# Patient Record
Sex: FEMALE | Race: WHITE | NOT HISPANIC OR LATINO | URBAN - NONMETROPOLITAN AREA
[De-identification: names, ages, dates, MRNs, and addresses within clinical notes are randomized per-mention and may not be internally consistent; named-entity substitution may affect disease eponyms.]

---

## 2020-06-30 ENCOUNTER — OFFICE VISIT (OUTPATIENT)
Dept: URBAN - NONMETROPOLITAN AREA SURGERY CENTER 1 | Facility: SURGERY CENTER | Age: 25
End: 2020-06-30
Payer: COMMERCIAL

## 2020-06-30 DIAGNOSIS — K22.8 COLUMNAR-LINED ESOPHAGUS: ICD-10-CM

## 2020-06-30 DIAGNOSIS — R19.4 ALTERED BOWEL HABITS: ICD-10-CM

## 2020-06-30 DIAGNOSIS — R10.84 ABDOMINAL CRAMPING, GENERALIZED: ICD-10-CM

## 2020-06-30 DIAGNOSIS — K29.60 ADENOPAPILLOMATOSIS GASTRICA: ICD-10-CM

## 2020-06-30 PROCEDURE — G9937 DIG OR SURV COLSCO: HCPCS | Performed by: INTERNAL MEDICINE

## 2020-06-30 PROCEDURE — 45380 COLONOSCOPY AND BIOPSY: CPT | Performed by: INTERNAL MEDICINE

## 2020-06-30 PROCEDURE — 43239 EGD BIOPSY SINGLE/MULTIPLE: CPT | Performed by: INTERNAL MEDICINE

## 2020-06-30 PROCEDURE — G8907 PT DOC NO EVENTS ON DISCHARG: HCPCS | Performed by: INTERNAL MEDICINE

## 2020-07-01 ENCOUNTER — TELEPHONE ENCOUNTER (OUTPATIENT)
Dept: URBAN - NONMETROPOLITAN AREA CLINIC 2 | Facility: CLINIC | Age: 25
End: 2020-07-01

## 2020-07-21 ENCOUNTER — WEB ENCOUNTER (OUTPATIENT)
Dept: URBAN - NONMETROPOLITAN AREA CLINIC 2 | Facility: CLINIC | Age: 25
End: 2020-07-21

## 2020-07-21 ENCOUNTER — OFFICE VISIT (OUTPATIENT)
Dept: URBAN - NONMETROPOLITAN AREA CLINIC 2 | Facility: CLINIC | Age: 25
End: 2020-07-21
Payer: COMMERCIAL

## 2020-07-21 DIAGNOSIS — K58.0 IRRITABLE BOWEL SYNDROME WITH DIARRHEA: ICD-10-CM

## 2020-07-21 DIAGNOSIS — K30 FUNCTIONAL DYSPEPSIA: ICD-10-CM

## 2020-07-21 PROBLEM — 197125005: Status: ACTIVE | Noted: 2020-07-21

## 2020-07-21 PROBLEM — 3696007: Status: ACTIVE | Noted: 2020-07-21

## 2020-07-21 PROCEDURE — G8420 CALC BMI NORM PARAMETERS: HCPCS | Performed by: NURSE PRACTITIONER

## 2020-07-21 PROCEDURE — 99213 OFFICE O/P EST LOW 20 MIN: CPT | Performed by: NURSE PRACTITIONER

## 2020-07-21 PROCEDURE — 1036F TOBACCO NON-USER: CPT | Performed by: NURSE PRACTITIONER

## 2020-07-21 PROCEDURE — G8427 DOCREV CUR MEDS BY ELIG CLIN: HCPCS | Performed by: NURSE PRACTITIONER

## 2020-07-21 RX ORDER — FAMOTIDINE 20 MG/1
1 TABLET TWICE DAILY TABLET, FILM COATED ORAL BID
Qty: 180 TABLET | Refills: 1 | OUTPATIENT
Start: 2020-07-21

## 2020-07-21 RX ORDER — RIFAXIMIN 550 MG/1
1 TABLET TABLET ORAL THREE TIMES A DAY
Qty: 42 TABLET | Refills: 2 | OUTPATIENT
Start: 2020-07-21 | End: 2020-09-01

## 2020-07-21 RX ORDER — PANTOPRAZOLE SODIUM 40 MG/1
TAKE 1 TABLET BY ORAL ROUTE DAILY FOR 90 DAYS TABLET, DELAYED RELEASE ORAL 1
OUTPATIENT
Start: 2020-02-28 | End: 2021-02-22

## 2020-07-21 RX ORDER — LISDEXAMFETAMINE DIMESYLATE 30 MG/1
CAPSULE ORAL
Qty: 0 | Refills: 0 | Status: ACTIVE | COMMUNITY
Start: 1900-01-01

## 2020-07-21 RX ORDER — CLONAZEPAM 0.5 MG/1
TABLET ORAL
Qty: 0 | Refills: 0 | Status: ACTIVE | COMMUNITY
Start: 1900-01-01

## 2020-07-21 RX ORDER — PROPRANOLOL HYDROCHLORIDE 40 MG/1
TABLET ORAL
Qty: 0 | Refills: 0 | Status: ACTIVE | COMMUNITY
Start: 1900-01-01

## 2020-07-21 RX ORDER — BUSPIRONE HYDROCHLORIDE 15 MG/1
TAKE 1 TABLET (15 MG) BY ORAL ROUTE 2 TIMES PER DAY TABLET ORAL 2
Qty: 0 | Refills: 0 | Status: ACTIVE | COMMUNITY
Start: 1900-01-01

## 2020-07-21 NOTE — HPI-OTHER HISTORIES
20 Sowmya presents for follow up. She had EGD and colonoscopy 20 that were normal with normal bx. No etiology for diarrhea. Labs including celiac panel and inflammatory markers were normal. She did not try the dicyclomine or the pantoprazole after her visit in [place] due to expense. She did get the Lomitil and Zofran after her EGD/colon and has done well with this. She continues with loose stool however this has improved. She has not tried Xifaxan in the past. She also continues with heartburn type symptoms. She has not tried Pepcid in the past. TG  20 Sowmya Beavers is a 25 year old female who is referred by [facility] for recurrent vaginal yeast infections despite multiple treatments, chronic abdominal pain and diarrhea. She reports abdominal pain, diarrhea with 2-10 stools daily, intermittent rectal bleeding, fecal urgency, tenesms since a young child. She was told she had colitis at age 12 but does not recall a workup. She has multiple paternal family members with rectal bleeding and hx of fistulas. Her paternal grandmother  of colon cancer and her father is being worked up for rectal cancer currently. She has labs with her today with normal CBC, CMP, ferritin, TSH, and kidney function. She has completed elimination diet. She thinks being gluten free may have been somewhat beneficial but has resumed gluten now.   She has chronic nausea, no vomiting, after meals. More recently she has also had some heartburn and reflux symptomsm with water brash. She has never had EGD or colonoscopy in the past but has seen multiple GIs that have recommended endoscopies. She is ready to go ahead with these now.  In terms of the vaginal yeast infection, this has been ongoing since July. Again, she has tried multiple treatment but yeast infection reoccurs. She may be seeing infectious disease soon. She has not tried Diflucan due to possible drug interaction. We discussed there is no good way to check for intraintestinal yeast other than a biopsy and typically this is found in the upper GI tract. TG

## 2020-09-14 ENCOUNTER — ERX REFILL RESPONSE (OUTPATIENT)
Dept: URBAN - NONMETROPOLITAN AREA CLINIC 2 | Facility: CLINIC | Age: 25
End: 2020-09-14

## 2020-09-14 RX ORDER — FAMOTIDINE 20 MG/1
1 TABLET TWICE DAILY TABLET, FILM COATED ORAL BID
Qty: 180 TABLETS | Refills: 3

## 2020-10-20 ENCOUNTER — OFFICE VISIT (OUTPATIENT)
Dept: URBAN - NONMETROPOLITAN AREA CLINIC 2 | Facility: CLINIC | Age: 25
End: 2020-10-20

## 2020-11-03 ENCOUNTER — OFFICE VISIT (OUTPATIENT)
Dept: URBAN - NONMETROPOLITAN AREA CLINIC 2 | Facility: CLINIC | Age: 25
End: 2020-11-03

## 2020-11-03 ENCOUNTER — DASHBOARD ENCOUNTERS (OUTPATIENT)
Age: 25
End: 2020-11-03

## 2020-11-03 RX ORDER — BUSPIRONE HYDROCHLORIDE 15 MG/1
TAKE 1 TABLET (15 MG) BY ORAL ROUTE 2 TIMES PER DAY TABLET ORAL 2
Qty: 0 | Refills: 0 | Status: ACTIVE | COMMUNITY
Start: 1900-01-01

## 2020-11-03 RX ORDER — PROPRANOLOL HYDROCHLORIDE 40 MG/1
TABLET ORAL
Qty: 0 | Refills: 0 | Status: ACTIVE | COMMUNITY
Start: 1900-01-01

## 2020-11-03 RX ORDER — LISDEXAMFETAMINE DIMESYLATE 30 MG/1
CAPSULE ORAL
Qty: 0 | Refills: 0 | Status: ACTIVE | COMMUNITY
Start: 1900-01-01

## 2020-11-03 RX ORDER — FAMOTIDINE 20 MG/1
1 TABLET TWICE DAILY TABLET, FILM COATED ORAL BID
Qty: 180 TABLETS | Refills: 3 | Status: ACTIVE | COMMUNITY

## 2020-11-03 RX ORDER — CLONAZEPAM 0.5 MG/1
TABLET ORAL
Qty: 0 | Refills: 0 | Status: ACTIVE | COMMUNITY
Start: 1900-01-01